# Patient Record
Sex: FEMALE | Race: WHITE | NOT HISPANIC OR LATINO | Employment: UNEMPLOYED | URBAN - METROPOLITAN AREA
[De-identification: names, ages, dates, MRNs, and addresses within clinical notes are randomized per-mention and may not be internally consistent; named-entity substitution may affect disease eponyms.]

---

## 2020-09-05 ENCOUNTER — NURSE TRIAGE (OUTPATIENT)
Dept: ADMINISTRATIVE | Facility: CLINIC | Age: 50
End: 2020-09-05

## 2020-09-05 NOTE — TELEPHONE ENCOUNTER
Reason for Disposition   General information question, no triage required and triager able to answer question    Protocols used: INFORMATION ONLY CALL-A-AH    Pt's Spouse requesting COVID test locations. Advised of [facility] community site for next week. Also advised to check the [facility] web site. Spouse verbalized understanding.

## 2020-09-30 PROBLEM — R94.31 NONSPECIFIC ABNORMAL ELECTROCARDIOGRAM (ECG) (EKG): Status: ACTIVE | Noted: 2020-09-30

## 2020-09-30 PROBLEM — R07.9 CHEST PAIN: Status: ACTIVE | Noted: 2020-09-30

## 2020-09-30 PROBLEM — I10 HYPERTENSION: Status: ACTIVE | Noted: 2020-09-30

## 2020-09-30 PROBLEM — E78.5 HYPERLIPIDEMIA: Status: ACTIVE | Noted: 2020-09-30

## 2020-10-22 PROBLEM — M54.2 NECK PAIN: Status: ACTIVE | Noted: 2020-10-22

## 2021-01-21 PROBLEM — M47.812 CERVICAL SPONDYLOSIS: Status: ACTIVE | Noted: 2021-01-21

## 2021-02-24 PROBLEM — Z98.1 S/P CERVICAL SPINAL FUSION: Status: ACTIVE | Noted: 2021-02-24
